# Patient Record
Sex: MALE | Race: WHITE | Employment: FULL TIME | URBAN - NONMETROPOLITAN AREA
[De-identification: names, ages, dates, MRNs, and addresses within clinical notes are randomized per-mention and may not be internally consistent; named-entity substitution may affect disease eponyms.]

---

## 2024-02-22 ENCOUNTER — OFFICE VISIT (OUTPATIENT)
Dept: FAMILY MEDICINE CLINIC | Age: 54
End: 2024-02-22
Payer: COMMERCIAL

## 2024-02-22 VITALS
BODY MASS INDEX: 32.73 KG/M2 | HEART RATE: 85 BPM | WEIGHT: 255 LBS | DIASTOLIC BLOOD PRESSURE: 80 MMHG | SYSTOLIC BLOOD PRESSURE: 128 MMHG | OXYGEN SATURATION: 98 % | HEIGHT: 74 IN

## 2024-02-22 DIAGNOSIS — J32.0 CHRONIC MAXILLARY SINUSITIS: ICD-10-CM

## 2024-02-22 DIAGNOSIS — E66.9 CLASS 1 OBESITY WITH BODY MASS INDEX (BMI) OF 32.0 TO 32.9 IN ADULT, UNSPECIFIED OBESITY TYPE, UNSPECIFIED WHETHER SERIOUS COMORBIDITY PRESENT: ICD-10-CM

## 2024-02-22 DIAGNOSIS — K00.1: ICD-10-CM

## 2024-02-22 DIAGNOSIS — Z12.11 COLON CANCER SCREENING: ICD-10-CM

## 2024-02-22 DIAGNOSIS — S09.93XS FACIAL INJURY, SEQUELA: ICD-10-CM

## 2024-02-22 DIAGNOSIS — R09.81 SINUS CONGESTION: ICD-10-CM

## 2024-02-22 DIAGNOSIS — K64.9 HEMORRHOIDS, UNSPECIFIED HEMORRHOID TYPE: ICD-10-CM

## 2024-02-22 DIAGNOSIS — K62.5 BRBPR (BRIGHT RED BLOOD PER RECTUM): ICD-10-CM

## 2024-02-22 DIAGNOSIS — Z76.89 ENCOUNTER TO ESTABLISH CARE: Primary | ICD-10-CM

## 2024-02-22 PROCEDURE — 3017F COLORECTAL CA SCREEN DOC REV: CPT

## 2024-02-22 PROCEDURE — 1036F TOBACCO NON-USER: CPT

## 2024-02-22 PROCEDURE — G8484 FLU IMMUNIZE NO ADMIN: HCPCS

## 2024-02-22 PROCEDURE — 99204 OFFICE O/P NEW MOD 45 MIN: CPT

## 2024-02-22 PROCEDURE — G8427 DOCREV CUR MEDS BY ELIG CLIN: HCPCS

## 2024-02-22 PROCEDURE — G0447 BEHAVIOR COUNSEL OBESITY 15M: HCPCS

## 2024-02-22 PROCEDURE — G8417 CALC BMI ABV UP PARAM F/U: HCPCS

## 2024-02-22 RX ORDER — FLUTICASONE PROPIONATE 50 MCG
1 SPRAY, SUSPENSION (ML) NASAL DAILY
Qty: 32 G | Refills: 1 | Status: SHIPPED | OUTPATIENT
Start: 2024-02-22

## 2024-02-22 ASSESSMENT — ENCOUNTER SYMPTOMS
ABDOMINAL PAIN: 0
SORE THROAT: 0
DIARRHEA: 0
COLOR CHANGE: 0
CONSTIPATION: 0
SHORTNESS OF BREATH: 0
FACIAL SWELLING: 1
ABDOMINAL DISTENTION: 0
COUGH: 0
EYE PAIN: 0
CHEST TIGHTNESS: 0
EYE DISCHARGE: 0

## 2024-02-22 ASSESSMENT — PATIENT HEALTH QUESTIONNAIRE - PHQ9
4. FEELING TIRED OR HAVING LITTLE ENERGY: 0
SUM OF ALL RESPONSES TO PHQ QUESTIONS 1-9: 0
9. THOUGHTS THAT YOU WOULD BE BETTER OFF DEAD, OR OF HURTING YOURSELF: 0
10. IF YOU CHECKED OFF ANY PROBLEMS, HOW DIFFICULT HAVE THESE PROBLEMS MADE IT FOR YOU TO DO YOUR WORK, TAKE CARE OF THINGS AT HOME, OR GET ALONG WITH OTHER PEOPLE: 0
5. POOR APPETITE OR OVEREATING: 0
3. TROUBLE FALLING OR STAYING ASLEEP: 0
6. FEELING BAD ABOUT YOURSELF - OR THAT YOU ARE A FAILURE OR HAVE LET YOURSELF OR YOUR FAMILY DOWN: 0
SUM OF ALL RESPONSES TO PHQ QUESTIONS 1-9: 0
SUM OF ALL RESPONSES TO PHQ9 QUESTIONS 1 & 2: 0
7. TROUBLE CONCENTRATING ON THINGS, SUCH AS READING THE NEWSPAPER OR WATCHING TELEVISION: 0
SUM OF ALL RESPONSES TO PHQ QUESTIONS 1-9: 0
8. MOVING OR SPEAKING SO SLOWLY THAT OTHER PEOPLE COULD HAVE NOTICED. OR THE OPPOSITE, BEING SO FIGETY OR RESTLESS THAT YOU HAVE BEEN MOVING AROUND A LOT MORE THAN USUAL: 0
SUM OF ALL RESPONSES TO PHQ QUESTIONS 1-9: 0
2. FEELING DOWN, DEPRESSED OR HOPELESS: 0
1. LITTLE INTEREST OR PLEASURE IN DOING THINGS: 0
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
## 2024-03-21 ENCOUNTER — TELEPHONE (OUTPATIENT)
Dept: FAMILY MEDICINE CLINIC | Age: 54
End: 2024-03-21

## 2024-03-21 NOTE — TELEPHONE ENCOUNTER
LVM for patient to return call.       Barriers to why patient continues to miss appointments?         Are we able to assist in future appts so that he does not No Show?        Discuss that any appointments that are missed either by No showing or failing to provide 24 hours notice to cancel/reschedule will result in Dismissal from the practice.

## 2024-07-08 ENCOUNTER — ANESTHESIA EVENT (OUTPATIENT)
Dept: ENDOSCOPY | Age: 54
End: 2024-07-08

## 2024-07-17 NOTE — H&P
regular rate and rhythm, normal S1 and S2, no S3 or S4, and no murmur noted    Lungs:  No increased work of breathing, good air exchange, clear to auscultation bilaterally, no crackles or wheezing    Abdomen:  No scars, normal bowel sounds, soft, non-distended, non-tender, no masses palpated, no hepatosplenomegally      ASA Class  ASA 2 - Patient with mild systemic disease with no functional limitations    Mallampati Class: 2      ASSESSMENT AND PLAN:    Colon cancer screening    Plan: colonoscopy    Colonoscopy with alternatives, rationale, benefits and risks, not limited to bleeding, infection, perforation, need of surgery, prolong hospital stay and anesthesia side effects were explained. Patient verbalized understanding and agrees to proceed with colonoscopy.       1.  Patient is a suitable candidate for endoscopic procedure and attendant anesthesia  2.  Risks, benefits, alternatives of procedure discussed in detail with patient including risks of bleeding, infection, perforation, risks of sedation, risks of missed lesions. The patient wishes to proceed.

## 2024-07-18 ENCOUNTER — HOSPITAL ENCOUNTER (OUTPATIENT)
Age: 54
Setting detail: OUTPATIENT SURGERY
Discharge: HOME | End: 2024-07-18
Attending: INTERNAL MEDICINE | Admitting: INTERNAL MEDICINE

## 2024-07-18 ENCOUNTER — ANESTHESIA (OUTPATIENT)
Dept: ENDOSCOPY | Age: 54
End: 2024-07-18

## 2024-07-18 VITALS
DIASTOLIC BLOOD PRESSURE: 78 MMHG | OXYGEN SATURATION: 97 % | HEART RATE: 69 BPM | HEIGHT: 73 IN | TEMPERATURE: 96.9 F | WEIGHT: 232 LBS | RESPIRATION RATE: 18 BRPM | SYSTOLIC BLOOD PRESSURE: 119 MMHG | BODY MASS INDEX: 30.75 KG/M2

## 2024-07-18 DIAGNOSIS — Z12.11 SPECIAL SCREENING FOR MALIGNANT NEOPLASMS, COLON: ICD-10-CM

## 2024-07-18 DIAGNOSIS — K62.89 RECTAL MASS: Primary | ICD-10-CM

## 2024-07-18 PROCEDURE — 88305 TISSUE EXAM BY PATHOLOGIST: CPT

## 2024-07-18 PROCEDURE — 2580000003 HC RX 258: Performed by: ANESTHESIOLOGY

## 2024-07-18 PROCEDURE — 6360000002 HC RX W HCPCS: Performed by: NURSE ANESTHETIST, CERTIFIED REGISTERED

## 2024-07-18 PROCEDURE — 2500000003 HC RX 250 WO HCPCS: Performed by: ANESTHESIOLOGY

## 2024-07-18 PROCEDURE — 7100000011 HC PHASE II RECOVERY - ADDTL 15 MIN: Performed by: INTERNAL MEDICINE

## 2024-07-18 PROCEDURE — 3609010300 HC COLONOSCOPY W/BIOPSY SINGLE/MULTIPLE: Performed by: INTERNAL MEDICINE

## 2024-07-18 PROCEDURE — 2709999900 HC NON-CHARGEABLE SUPPLY: Performed by: INTERNAL MEDICINE

## 2024-07-18 PROCEDURE — 3700000001 HC ADD 15 MINUTES (ANESTHESIA): Performed by: INTERNAL MEDICINE

## 2024-07-18 PROCEDURE — 88342 IMHCHEM/IMCYTCHM 1ST ANTB: CPT

## 2024-07-18 PROCEDURE — 2500000003 HC RX 250 WO HCPCS: Performed by: NURSE ANESTHETIST, CERTIFIED REGISTERED

## 2024-07-18 PROCEDURE — 88341 IMHCHEM/IMCYTCHM EA ADD ANTB: CPT

## 2024-07-18 PROCEDURE — 3700000000 HC ANESTHESIA ATTENDED CARE: Performed by: INTERNAL MEDICINE

## 2024-07-18 PROCEDURE — 7100000010 HC PHASE II RECOVERY - FIRST 15 MIN: Performed by: INTERNAL MEDICINE

## 2024-07-18 RX ORDER — OXYCODONE HYDROCHLORIDE 5 MG/1
5 TABLET ORAL PRN
Status: CANCELLED | OUTPATIENT
Start: 2024-07-18 | End: 2024-07-18

## 2024-07-18 RX ORDER — ONDANSETRON 2 MG/ML
4 INJECTION INTRAMUSCULAR; INTRAVENOUS
Status: CANCELLED | OUTPATIENT
Start: 2024-07-18 | End: 2024-07-19

## 2024-07-18 RX ORDER — PROPOFOL 10 MG/ML
INJECTION, EMULSION INTRAVENOUS PRN
Status: DISCONTINUED | OUTPATIENT
Start: 2024-07-18 | End: 2024-07-18 | Stop reason: SDUPTHER

## 2024-07-18 RX ORDER — LIDOCAINE HYDROCHLORIDE 20 MG/ML
INJECTION, SOLUTION INFILTRATION; PERINEURAL PRN
Status: DISCONTINUED | OUTPATIENT
Start: 2024-07-18 | End: 2024-07-18 | Stop reason: SDUPTHER

## 2024-07-18 RX ORDER — OXYCODONE HYDROCHLORIDE AND ACETAMINOPHEN 5; 325 MG/1; MG/1
1 TABLET ORAL EVERY 6 HOURS PRN
Qty: 12 TABLET | Refills: 0 | Status: SHIPPED | OUTPATIENT
Start: 2024-07-18 | End: 2024-07-23

## 2024-07-18 RX ORDER — SODIUM CHLORIDE 0.9 % (FLUSH) 0.9 %
5-40 SYRINGE (ML) INJECTION PRN
Status: CANCELLED | OUTPATIENT
Start: 2024-07-18

## 2024-07-18 RX ORDER — MEPERIDINE HYDROCHLORIDE 25 MG/ML
12.5 INJECTION INTRAMUSCULAR; INTRAVENOUS; SUBCUTANEOUS EVERY 5 MIN PRN
Status: CANCELLED | OUTPATIENT
Start: 2024-07-18

## 2024-07-18 RX ORDER — SODIUM CHLORIDE 9 MG/ML
INJECTION, SOLUTION INTRAVENOUS PRN
Status: CANCELLED | OUTPATIENT
Start: 2024-07-18

## 2024-07-18 RX ORDER — SODIUM CHLORIDE 0.9 % (FLUSH) 0.9 %
5-40 SYRINGE (ML) INJECTION EVERY 12 HOURS SCHEDULED
Status: CANCELLED | OUTPATIENT
Start: 2024-07-18

## 2024-07-18 RX ORDER — SODIUM CHLORIDE 9 MG/ML
INJECTION, SOLUTION INTRAVENOUS PRN
Status: DISCONTINUED | OUTPATIENT
Start: 2024-07-18 | End: 2024-07-18 | Stop reason: HOSPADM

## 2024-07-18 RX ORDER — SODIUM CHLORIDE 0.9 % (FLUSH) 0.9 %
5-40 SYRINGE (ML) INJECTION PRN
Status: DISCONTINUED | OUTPATIENT
Start: 2024-07-18 | End: 2024-07-18 | Stop reason: HOSPADM

## 2024-07-18 RX ORDER — NALOXONE HYDROCHLORIDE 0.4 MG/ML
INJECTION, SOLUTION INTRAMUSCULAR; INTRAVENOUS; SUBCUTANEOUS PRN
Status: CANCELLED | OUTPATIENT
Start: 2024-07-18

## 2024-07-18 RX ORDER — SODIUM CHLORIDE, SODIUM LACTATE, POTASSIUM CHLORIDE, CALCIUM CHLORIDE 600; 310; 30; 20 MG/100ML; MG/100ML; MG/100ML; MG/100ML
INJECTION, SOLUTION INTRAVENOUS CONTINUOUS
Status: DISCONTINUED | OUTPATIENT
Start: 2024-07-18 | End: 2024-07-18 | Stop reason: HOSPADM

## 2024-07-18 RX ORDER — OXYCODONE HYDROCHLORIDE 5 MG/1
10 TABLET ORAL PRN
Status: CANCELLED | OUTPATIENT
Start: 2024-07-18 | End: 2024-07-18

## 2024-07-18 RX ORDER — SODIUM CHLORIDE 0.9 % (FLUSH) 0.9 %
5-40 SYRINGE (ML) INJECTION EVERY 12 HOURS SCHEDULED
Status: DISCONTINUED | OUTPATIENT
Start: 2024-07-18 | End: 2024-07-18 | Stop reason: HOSPADM

## 2024-07-18 RX ADMIN — LIDOCAINE HYDROCHLORIDE 60 MG: 20 INJECTION, SOLUTION INFILTRATION; PERINEURAL at 09:20

## 2024-07-18 RX ADMIN — Medication 20 MG: at 08:51

## 2024-07-18 RX ADMIN — SODIUM CHLORIDE, POTASSIUM CHLORIDE, SODIUM LACTATE AND CALCIUM CHLORIDE: 600; 310; 30; 20 INJECTION, SOLUTION INTRAVENOUS at 09:17

## 2024-07-18 RX ADMIN — PROPOFOL 340 MG: 10 INJECTION, EMULSION INTRAVENOUS at 09:20

## 2024-07-18 ASSESSMENT — LIFESTYLE VARIABLES: SMOKING_STATUS: 0

## 2024-07-18 ASSESSMENT — PAIN - FUNCTIONAL ASSESSMENT
PAIN_FUNCTIONAL_ASSESSMENT: 0-10
PAIN_FUNCTIONAL_ASSESSMENT: NONE - DENIES PAIN

## 2024-07-18 ASSESSMENT — ENCOUNTER SYMPTOMS: SHORTNESS OF BREATH: 0

## 2024-07-18 ASSESSMENT — PAIN DESCRIPTION - DESCRIPTORS: DESCRIPTORS: DISCOMFORT

## 2024-07-18 NOTE — ANESTHESIA PRE PROCEDURE
Department of Anesthesiology  Preprocedure Note       Name:  Kirk Arrington   Age:  54 y.o.  :  1970                                          MRN:  [id number]         Date:  2024      Surgeon: Surgeon(s):  James Brown MD    Procedure: Procedure(s):  COLON W/ANES. (9:30)    Medications prior to admission:   Prior to Admission medications    Not on File       Current medications:    Current Facility-Administered Medications   Medication Dose Route Frequency Provider Last Rate Last Admin    famotidine (PEPCID) 20 mg in sodium chloride (PF) 0.9 % 10 mL injection  20 mg IntraVENous Once Marky Kemp MD        lactated ringers IV soln infusion   IntraVENous Continuous Marky Kemp MD        sodium chloride flush 0.9 % injection 5-40 mL  5-40 mL IntraVENous 2 times per day Marky Kemp MD        sodium chloride flush 0.9 % injection 5-40 mL  5-40 mL IntraVENous PRN Marky Kemp MD        0.9 % sodium chloride infusion   IntraVENous PRN Marky Kemp MD           Allergies:  No Known Allergies    Problem List:    Patient Active Problem List   Diagnosis Code    Class 1 obesity with body mass index (BMI) of 32.0 to 32.9 in adult E66.9, Z68.32    Hyperdontia K00.1       Past Medical History:  History reviewed. No pertinent past medical history.    Past Surgical History:  History reviewed. No pertinent surgical history.    Social History:    Social History     Tobacco Use    Smoking status: Never    Smokeless tobacco: Never   Substance Use Topics    Alcohol use: Never                                Counseling given: Not Answered      Vital Signs (Current):   Vitals:    24 1311 24 0835   BP:  (!) 150/86   Pulse:  89   Resp:  16   Temp:  97.5 °F (36.4 °C)   TempSrc:  Infrared   SpO2:  97%   Weight: 105.2 kg (232 lb)    Height: 1.854 m (6' 1\")                                               BP Readings from Last 3 Encounters:   24 (!)

## 2024-07-18 NOTE — PROGRESS NOTES
Patient's IV removed. Patient taken out from same day in stable condition per wheelchair at this time.

## 2024-07-18 NOTE — ANESTHESIA POSTPROCEDURE EVALUATION
Department of Anesthesiology  Postprocedure Note    Patient: Kirk Arrington  MRN: [id number]  YOB: 1970  Date of evaluation: 7/18/2024    Procedure Summary       Date: 07/18/24 Room / Location: [address]    Anesthesia Start: 0917 Anesthesia Stop: 0951    Procedure: COLONOSCOPY BIOPSY Diagnosis:       Special screening for malignant neoplasms, colon      (Special screening for malignant neoplasms, colon [Z12.11])    Surgeons: James Brown MD Responsible Provider: Marky Kemp MD    Anesthesia Type: MAC ASA Status: 2            Anesthesia Type: MAC    Lawrence Phase I: Lawrence Score: 10    Lawrence Phase II: Lawrence Score: 10    Anesthesia Post Evaluation    Patient location during evaluation: bedside  Patient participation: complete - patient participated  Level of consciousness: awake and alert  Airway patency: patent  Nausea & Vomiting: no nausea  Cardiovascular status: hemodynamically stable  Respiratory status: acceptable  Hydration status: euvolemic  Pain management: adequate      Vitals:    07/18/24 0835 07/18/24 0948 07/18/24 0954 07/18/24 1011   BP: (!) 150/86 127/64 112/76 119/78   Pulse: 89 79 70 69   Resp: 16 16 18 18   Temp: 97.5 °F (36.4 °C) 97.1 °F (36.2 °C)  96.9 °F (36.1 °C)   TempSrc: Infrared Temporal  Temporal   SpO2: 97% 97% 95% 97%   Weight:       Height:          No notable events documented.

## 2024-07-18 NOTE — DISCHARGE INSTRUCTIONS
PATIENT INSTRUCTIONS  POST-SEDATION    Kirk Arrington          IMMEDIATELY FOLLOWING PROCEDURE:    Do not drive or operate machinery for the first twenty four hours after surgery.     Do not make any important decisions for twenty four hours after surgery or while taking narcotic pain medications or sedatives.     You should NOT BE LEFT UNATTENDED OR ALONE. A responsible adult should be with you for the rest of the day of your procedure and also during the night for your protection and safety.    You may experience some light headedness. Rest at home with activity as tolerated. You may not need to go to bed, but it is important to rest for the next 24 hours. You should not engage in athletic sports such as basketball, volleyball, jogging, skating, or activities requiring refined motor skills for 24 hours.   If you develop intractable nausea and vomiting or a severe headache please notify your doctor immediately.   You are not expected to have any fever, but if you feel warm, take your temperature. If you have a fever 101 degrees or higher, call your doctor.     If you have had an Endoscopy:   *Eat lightly for your first meal and gradually resume your normal / prescribed diet. DO NOT eat or drink until your gag reflex returns.   *If you have a sore throat you may use lozenges, or salt water gargles.   *If you have had a colonoscopy, do not expect a normal bowel movement for approximately three days due to the cleansing of the large intestine prior to colonoscopy.    ONCE YOU ARE HOME, IF YOU SHOULD HAVE:  Difficulty in breathing, persistent nausea or vomiting, bleeding you feel is excessive, or pain that is unusual, increased abdominal bloating, or any swelling, fever / chills, call your physician. If you cannot contact your physician, but feel that your signs and symptoms need a physician's attention, go to the Emergency Department.      FOLLOW-UP:    Please follow up with your primary care provider as scheduled or

## 2024-07-19 ENCOUNTER — TELEPHONE (OUTPATIENT)
Dept: SURGERY | Age: 54
End: 2024-07-19

## 2024-07-19 NOTE — TELEPHONE ENCOUNTER
Referral from Dr. Brown to Dr. Mcgovern for rectal cancer.   Left vm asking patient to call office to schedule appointment. [place] can be offered.

## 2024-07-23 NOTE — TELEPHONE ENCOUNTER
LVM for patient to call the office and ask to speak to Shelia. Referral from Dr. Brown to Dr. Mcgovern for rectal cancer. Patient needs MRI, CT and CEA. Colonoscopy done @ [facility].

## 2024-07-25 NOTE — TELEPHONE ENCOUNTER
LVM for patient to call the office and ask to speak to Shelia. Referral from Dr. Brown to Dr. Mcgovern for rectal cancer. Patient needs MRI, CT and CEA. Colonoscopy done @ [facility]. 3rd attempt to contact patient. Will notify Dr. Mcgovern.

## 2024-07-29 NOTE — TELEPHONE ENCOUNTER
Letter sent to [address] [place] [address] [address], [place], [place] [address] informing patient we have attempted to reach him to schedule office visit with Dr. Mcgovern however have been unsuccessful. Will call patient later this week to attempt to schedule office visit.

## 2024-07-31 NOTE — TELEPHONE ENCOUNTER
LVM for patient to call the office and ask to speak to Shelia. Referral from Dr. Brown to Dr. Mcgovern for rectal cancer. Patient needs MRI, CT and CEA. Colonoscopy done @ [facility]. 4th attempt to contact patient. Letter mailed earlier this week.

## (undated) DEVICE — FORCEP BX STD CAP 240CM RAD JAW 4

## (undated) DEVICE — ELECTRODE,RADIOTRANSLUCENT,FOAM,3PK: Brand: MEDLINE

## (undated) DEVICE — ENDO CARRY-ON PROCEDURE KIT INCLUDES SUCTION TUBING, LUBRICANT, GAUZE, BIOHAZARD STICKER, TRANSPORT PAD AND INTERCEPT BEDSIDE KIT.: Brand: ENDO CARRY-ON PROCEDURE KIT